# Patient Record
Sex: FEMALE | Race: WHITE | NOT HISPANIC OR LATINO
[De-identification: names, ages, dates, MRNs, and addresses within clinical notes are randomized per-mention and may not be internally consistent; named-entity substitution may affect disease eponyms.]

---

## 2020-04-30 ENCOUNTER — TRANSCRIPTION ENCOUNTER (OUTPATIENT)
Age: 55
End: 2020-04-30

## 2021-07-29 ENCOUNTER — RESULT REVIEW (OUTPATIENT)
Age: 56
End: 2021-07-29

## 2022-06-22 ENCOUNTER — OFFICE (OUTPATIENT)
Dept: URBAN - METROPOLITAN AREA CLINIC 12 | Facility: CLINIC | Age: 57
Setting detail: OPHTHALMOLOGY
End: 2022-06-22
Payer: COMMERCIAL

## 2022-06-22 DIAGNOSIS — H01.001: ICD-10-CM

## 2022-06-22 DIAGNOSIS — H01.004: ICD-10-CM

## 2022-06-22 DIAGNOSIS — D31.31: ICD-10-CM

## 2022-06-22 DIAGNOSIS — H25.13: ICD-10-CM

## 2022-06-22 PROCEDURE — 92004 COMPRE OPH EXAM NEW PT 1/>: CPT | Performed by: OPTOMETRIST

## 2022-06-22 PROCEDURE — 92250 FUNDUS PHOTOGRAPHY W/I&R: CPT | Performed by: OPTOMETRIST

## 2022-06-22 ASSESSMENT — REFRACTION_MANIFEST
OD_VA1: 20/20-
OD_ADD: +2.00
OS_SPHERE: PLANO
OD_CYLINDER: SPH
OS_CYLINDER: SPH
OD_SPHERE: +0.25
OS_ADD: +2.00
OS_VA1: 20/20-

## 2022-06-22 ASSESSMENT — VISUAL ACUITY
OS_BCVA: 20/20-2
OD_BCVA: 20/20-2

## 2022-06-22 ASSESSMENT — REFRACTION_CURRENTRX
OD_ADD: +2.00
OD_OVR_VA: 20/
OS_VPRISM_DIRECTION: SV
OS_OVR_VA: 20/
OS_ADD: +2.00
OD_VPRISM_DIRECTION: SV

## 2022-06-22 ASSESSMENT — AXIALLENGTH_DERIVED
OD_AL: 23.7629
OS_AL: 23.8678

## 2022-06-22 ASSESSMENT — KERATOMETRY
OS_AXISANGLE_DEGREES: 077
OD_K2POWER_DIOPTERS: 42.75
OD_AXISANGLE_DEGREES: 085
OS_K2POWER_DIOPTERS: 42.75
OD_K1POWER_DIOPTERS: 42.00
OS_K1POWER_DIOPTERS: 42.50

## 2022-06-22 ASSESSMENT — REFRACTION_AUTOREFRACTION
OD_SPHERE: +1.00
OS_AXIS: 106
OD_CYLINDER: -0.75
OD_AXIS: 089
OS_CYLINDER: -0.75
OS_SPHERE: +0.50

## 2022-06-22 ASSESSMENT — TONOMETRY
OD_IOP_MMHG: 15
OS_IOP_MMHG: 15

## 2022-06-22 ASSESSMENT — CONFRONTATIONAL VISUAL FIELD TEST (CVF)
OD_FINDINGS: FULL
OS_FINDINGS: FULL

## 2022-06-22 ASSESSMENT — SPHEQUIV_DERIVED
OS_SPHEQUIV: 0.125
OD_SPHEQUIV: 0.625

## 2022-06-22 ASSESSMENT — LID EXAM ASSESSMENTS
OD_BLEPHARITIS: T
OS_BLEPHARITIS: T

## 2022-08-06 ENCOUNTER — INPATIENT (INPATIENT)
Facility: HOSPITAL | Age: 57
LOS: 3 days | Discharge: HOME | DRG: 177 | End: 2022-08-10
Attending: HOSPITALIST | Admitting: HOSPITALIST
Payer: COMMERCIAL

## 2022-08-06 VITALS
RESPIRATION RATE: 19 BRPM | OXYGEN SATURATION: 96 % | HEART RATE: 96 BPM | TEMPERATURE: 99 F | SYSTOLIC BLOOD PRESSURE: 157 MMHG | WEIGHT: 250 LBS | HEIGHT: 63 IN | DIASTOLIC BLOOD PRESSURE: 94 MMHG

## 2022-08-06 DIAGNOSIS — J96.01 ACUTE RESPIRATORY FAILURE WITH HYPOXIA: ICD-10-CM

## 2022-08-06 LAB
ADD ON TEST-SPECIMEN IN LAB: SIGNIFICANT CHANGE UP
ALBUMIN SERPL ELPH-MCNC: 3.1 G/DL — LOW (ref 3.3–5)
ALP SERPL-CCNC: 104 U/L — SIGNIFICANT CHANGE UP (ref 40–120)
ALT FLD-CCNC: 10 U/L — LOW (ref 12–78)
ANION GAP SERPL CALC-SCNC: 6 MMOL/L — SIGNIFICANT CHANGE UP (ref 5–17)
AST SERPL-CCNC: <3 U/L — LOW (ref 15–37)
BASOPHILS # BLD AUTO: 0 K/UL — SIGNIFICANT CHANGE UP (ref 0–0.2)
BASOPHILS NFR BLD AUTO: 0 % — SIGNIFICANT CHANGE UP (ref 0–2)
BILIRUB SERPL-MCNC: 0.3 MG/DL — SIGNIFICANT CHANGE UP (ref 0.2–1.2)
BUN SERPL-MCNC: 14 MG/DL — SIGNIFICANT CHANGE UP (ref 7–23)
CALCIUM SERPL-MCNC: 8.8 MG/DL — SIGNIFICANT CHANGE UP (ref 8.5–10.1)
CHLORIDE SERPL-SCNC: 104 MMOL/L — SIGNIFICANT CHANGE UP (ref 96–108)
CO2 SERPL-SCNC: 25 MMOL/L — SIGNIFICANT CHANGE UP (ref 22–31)
CREAT SERPL-MCNC: 0.54 MG/DL — SIGNIFICANT CHANGE UP (ref 0.5–1.3)
CRP SERPL-MCNC: 9 MG/L — HIGH
D DIMER BLD IA.RAPID-MCNC: 180 NG/ML DDU — SIGNIFICANT CHANGE UP
EGFR: 108 ML/MIN/1.73M2 — SIGNIFICANT CHANGE UP
EOSINOPHIL # BLD AUTO: 0 K/UL — SIGNIFICANT CHANGE UP (ref 0–0.5)
EOSINOPHIL NFR BLD AUTO: 0 % — SIGNIFICANT CHANGE UP (ref 0–6)
FERRITIN SERPL-MCNC: 89 NG/ML — SIGNIFICANT CHANGE UP (ref 15–150)
GLUCOSE SERPL-MCNC: 149 MG/DL — HIGH (ref 70–99)
HCT VFR BLD CALC: 42.1 % — SIGNIFICANT CHANGE UP (ref 34.5–45)
HGB BLD-MCNC: 14.7 G/DL — SIGNIFICANT CHANGE UP (ref 11.5–15.5)
LIDOCAIN IGE QN: 76 U/L — SIGNIFICANT CHANGE UP (ref 73–393)
LYMPHOCYTES # BLD AUTO: 0.16 K/UL — LOW (ref 1–3.3)
LYMPHOCYTES # BLD AUTO: 3 % — LOW (ref 13–44)
MCHC RBC-ENTMCNC: 31.5 PG — SIGNIFICANT CHANGE UP (ref 27–34)
MCHC RBC-ENTMCNC: 34.9 GM/DL — SIGNIFICANT CHANGE UP (ref 32–36)
MCV RBC AUTO: 90.1 FL — SIGNIFICANT CHANGE UP (ref 80–100)
MONOCYTES # BLD AUTO: 0.16 K/UL — SIGNIFICANT CHANGE UP (ref 0–0.9)
MONOCYTES NFR BLD AUTO: 3 % — SIGNIFICANT CHANGE UP (ref 2–14)
NEUTROPHILS # BLD AUTO: 5.03 K/UL — SIGNIFICANT CHANGE UP (ref 1.8–7.4)
NEUTROPHILS NFR BLD AUTO: 92 % — HIGH (ref 43–77)
NRBC # BLD: SIGNIFICANT CHANGE UP /100 WBCS (ref 0–0)
PLATELET # BLD AUTO: 223 K/UL — SIGNIFICANT CHANGE UP (ref 150–400)
POTASSIUM SERPL-MCNC: 3.8 MMOL/L — SIGNIFICANT CHANGE UP (ref 3.5–5.3)
POTASSIUM SERPL-SCNC: 3.8 MMOL/L — SIGNIFICANT CHANGE UP (ref 3.5–5.3)
PROCALCITONIN SERPL-MCNC: 0.05 NG/ML — SIGNIFICANT CHANGE UP (ref 0.02–0.1)
PROT SERPL-MCNC: 6.8 GM/DL — SIGNIFICANT CHANGE UP (ref 6–8.3)
RBC # BLD: 4.67 M/UL — SIGNIFICANT CHANGE UP (ref 3.8–5.2)
RBC # FLD: 12.3 % — SIGNIFICANT CHANGE UP (ref 10.3–14.5)
SODIUM SERPL-SCNC: 135 MMOL/L — SIGNIFICANT CHANGE UP (ref 135–145)
WBC # BLD: 5.41 K/UL — SIGNIFICANT CHANGE UP (ref 3.8–10.5)
WBC # FLD AUTO: 5.41 K/UL — SIGNIFICANT CHANGE UP (ref 3.8–10.5)

## 2022-08-06 PROCEDURE — 83735 ASSAY OF MAGNESIUM: CPT

## 2022-08-06 PROCEDURE — 93005 ELECTROCARDIOGRAM TRACING: CPT

## 2022-08-06 PROCEDURE — 85379 FIBRIN DEGRADATION QUANT: CPT

## 2022-08-06 PROCEDURE — 36415 COLL VENOUS BLD VENIPUNCTURE: CPT

## 2022-08-06 PROCEDURE — 82306 VITAMIN D 25 HYDROXY: CPT

## 2022-08-06 PROCEDURE — 84145 PROCALCITONIN (PCT): CPT

## 2022-08-06 PROCEDURE — 86140 C-REACTIVE PROTEIN: CPT

## 2022-08-06 PROCEDURE — 80053 COMPREHEN METABOLIC PANEL: CPT

## 2022-08-06 PROCEDURE — 86769 SARS-COV-2 COVID-19 ANTIBODY: CPT

## 2022-08-06 PROCEDURE — 82728 ASSAY OF FERRITIN: CPT

## 2022-08-06 PROCEDURE — 85025 COMPLETE CBC W/AUTO DIFF WBC: CPT

## 2022-08-06 PROCEDURE — 93010 ELECTROCARDIOGRAM REPORT: CPT

## 2022-08-06 PROCEDURE — 71045 X-RAY EXAM CHEST 1 VIEW: CPT | Mod: 26

## 2022-08-06 PROCEDURE — 97116 GAIT TRAINING THERAPY: CPT | Mod: GP

## 2022-08-06 PROCEDURE — 84100 ASSAY OF PHOSPHORUS: CPT

## 2022-08-06 PROCEDURE — 97161 PT EVAL LOW COMPLEX 20 MIN: CPT | Mod: GP

## 2022-08-06 PROCEDURE — 84484 ASSAY OF TROPONIN QUANT: CPT

## 2022-08-06 PROCEDURE — 93970 EXTREMITY STUDY: CPT

## 2022-08-06 PROCEDURE — 99291 CRITICAL CARE FIRST HOUR: CPT

## 2022-08-06 RX ORDER — REMDESIVIR 5 MG/ML
100 INJECTION INTRAVENOUS EVERY 24 HOURS
Refills: 0 | Status: COMPLETED | OUTPATIENT
Start: 2022-08-07 | End: 2022-08-10

## 2022-08-06 RX ORDER — ENOXAPARIN SODIUM 100 MG/ML
40 INJECTION SUBCUTANEOUS EVERY 12 HOURS
Refills: 0 | Status: DISCONTINUED | OUTPATIENT
Start: 2022-08-06 | End: 2022-08-10

## 2022-08-06 RX ORDER — ONDANSETRON 8 MG/1
4 TABLET, FILM COATED ORAL ONCE
Refills: 0 | Status: COMPLETED | OUTPATIENT
Start: 2022-08-06 | End: 2022-08-06

## 2022-08-06 RX ORDER — DEXAMETHASONE 0.5 MG/5ML
6 ELIXIR ORAL ONCE
Refills: 0 | Status: COMPLETED | OUTPATIENT
Start: 2022-08-06 | End: 2022-08-06

## 2022-08-06 RX ORDER — ONDANSETRON 8 MG/1
8 TABLET, FILM COATED ORAL EVERY 8 HOURS
Refills: 0 | Status: DISCONTINUED | OUTPATIENT
Start: 2022-08-06 | End: 2022-08-10

## 2022-08-06 RX ORDER — SODIUM CHLORIDE 9 MG/ML
2000 INJECTION INTRAMUSCULAR; INTRAVENOUS; SUBCUTANEOUS ONCE
Refills: 0 | Status: COMPLETED | OUTPATIENT
Start: 2022-08-06 | End: 2022-08-06

## 2022-08-06 RX ORDER — DEXAMETHASONE 0.5 MG/5ML
6 ELIXIR ORAL DAILY
Refills: 0 | Status: DISCONTINUED | OUTPATIENT
Start: 2022-08-07 | End: 2022-08-10

## 2022-08-06 RX ORDER — FAMOTIDINE 10 MG/ML
20 INJECTION INTRAVENOUS ONCE
Refills: 0 | Status: COMPLETED | OUTPATIENT
Start: 2022-08-06 | End: 2022-08-06

## 2022-08-06 RX ORDER — PROCHLORPERAZINE MALEATE 5 MG
10 TABLET ORAL ONCE
Refills: 0 | Status: COMPLETED | OUTPATIENT
Start: 2022-08-06 | End: 2022-08-06

## 2022-08-06 RX ORDER — ALBUTEROL 90 UG/1
2 AEROSOL, METERED ORAL EVERY 4 HOURS
Refills: 0 | Status: DISCONTINUED | OUTPATIENT
Start: 2022-08-06 | End: 2022-08-10

## 2022-08-06 RX ORDER — REMDESIVIR 5 MG/ML
200 INJECTION INTRAVENOUS EVERY 24 HOURS
Refills: 0 | Status: COMPLETED | OUTPATIENT
Start: 2022-08-06 | End: 2022-08-06

## 2022-08-06 RX ORDER — ACETAMINOPHEN 500 MG
650 TABLET ORAL EVERY 4 HOURS
Refills: 0 | Status: DISCONTINUED | OUTPATIENT
Start: 2022-08-06 | End: 2022-08-10

## 2022-08-06 RX ORDER — REMDESIVIR 5 MG/ML
INJECTION INTRAVENOUS
Refills: 0 | Status: COMPLETED | OUTPATIENT
Start: 2022-08-06 | End: 2022-08-10

## 2022-08-06 RX ADMIN — Medication 10 MILLIGRAM(S): at 05:09

## 2022-08-06 RX ADMIN — REMDESIVIR 500 MILLIGRAM(S): 5 INJECTION INTRAVENOUS at 12:38

## 2022-08-06 RX ADMIN — Medication 6 MILLIGRAM(S): at 08:26

## 2022-08-06 RX ADMIN — SODIUM CHLORIDE 2000 MILLILITER(S): 9 INJECTION INTRAMUSCULAR; INTRAVENOUS; SUBCUTANEOUS at 02:59

## 2022-08-06 RX ADMIN — ENOXAPARIN SODIUM 40 MILLIGRAM(S): 100 INJECTION SUBCUTANEOUS at 21:03

## 2022-08-06 RX ADMIN — ONDANSETRON 4 MILLIGRAM(S): 8 TABLET, FILM COATED ORAL at 08:42

## 2022-08-06 RX ADMIN — FAMOTIDINE 20 MILLIGRAM(S): 10 INJECTION INTRAVENOUS at 03:03

## 2022-08-06 RX ADMIN — ONDANSETRON 4 MILLIGRAM(S): 8 TABLET, FILM COATED ORAL at 03:03

## 2022-08-06 RX ADMIN — ENOXAPARIN SODIUM 40 MILLIGRAM(S): 100 INJECTION SUBCUTANEOUS at 12:38

## 2022-08-06 NOTE — H&P ADULT - NSHPPHYSICALEXAM_GEN_ALL_CORE
ICU Vital Signs Last 24 Hrs  T(C): 36.8 (06 Aug 2022 07:15), Max: 37.1 (06 Aug 2022 02:04)  T(F): 98.2 (06 Aug 2022 07:15), Max: 98.7 (06 Aug 2022 02:04)  HR: 84 (06 Aug 2022 07:15) (84 - 96)  BP: 157/71 (06 Aug 2022 07:15) (157/71 - 157/94)  BP(mean): 89 (06 Aug 2022 05:44) (89 - 89)  ABP: --  ABP(mean): --  RR: 18 (06 Aug 2022 07:15) (18 - 19)  SpO2: 93% (06 Aug 2022 07:47) (89% - 96%)    O2 Parameters below as of 06 Aug 2022 07:47  Patient On (Oxygen Delivery Method): nasal cannula  O2 Flow (L/min): 2          PHYSICAL EXAM:    Constitutional: NAD, awake and alert  HEENT: PERR, EOMI, Normal Hearing, MMM  Neck: Soft and supple, No LAD, No JVD  Respiratory: Breath sounds are clear bilaterally, No wheezing, rales or rhonchi  Cardiovascular: S1 and S2, regular rate and rhythm, no Murmurs, gallops or rubs  Gastrointestinal: Bowel Sounds present, soft, nontender, nondistended, no guarding, no rebound  Extremities: No peripheral edema  Vascular: 2+ peripheral pulses  Neurological: A/O x 3, no focal deficits  Musculoskeletal: 5/5 strength b/l upper and lower extremities  Skin: No rashes

## 2022-08-06 NOTE — ED ADULT NURSE REASSESSMENT NOTE - NS ED NURSE REASSESS COMMENT FT1
Assumed care of Pt from MAXINE Kirby. Pt received resting comfortably in stretcher. Vital signs reassessed and stable at this time. Pt requesting a phone  for android. No additional requests or complaints. Patient safety maintained. Call to [facility], report given to MAXINE Naranjo. Pending transport.

## 2022-08-06 NOTE — ED ADULT NURSE REASSESSMENT NOTE - NS ED NURSE REASSESS COMMENT FT1
Received pt from prior RN, pt is a&0x4, airway patent, does not show any s/s of respiratory distress, breathing is unlabored, color is culturally appropriate, vs are WNL pt ordering lunch at this time, pt not in any distress

## 2022-08-06 NOTE — ED PROVIDER NOTE - OBJECTIVE STATEMENT
58 yo f pw n/v covid positive 58 yo female presents to the ED c/o n/v. Pt COVID + since Tuesday. Unable to eat since this morning, and reports x8 episodes of vomiting today per EMS. Pt also notes some throat pain.

## 2022-08-06 NOTE — ED ADULT NURSE NOTE - OBJECTIVE STATEMENT
Pt presents to the ED c/o vomiting since today. pt tested positive for covid on Tuesday. pt endorses nausea, SOB when vomiting and throat pain. Respirations even and unlabored, air way patent, O2 saturation 85-92% on room air, placed on 2L Nasal cannula now satting 96%.

## 2022-08-06 NOTE — ED PROVIDER NOTE - PHYSICAL EXAMINATION
Gen:  Well appearing in NAD  Head:  NC/AT  HEENT: pupils perrl,no pharyngeal erythema, uvula midline  Cardiac: S1S2, RRR  Abd: Soft, non tender  Resp: No distress, CTA   musculoskeletal:: no deformities, no swelling, strength +5/+5  Skin: warm and dry as visualized, no rashes  Neuro: GIBBONS, aao x 4  Psych:alert, cooperative, appropriate mood and affect for situation Gen:  Well appearing in NAD  Head:  NC/AT  HEENT: pupils perrl,+  pharyngeal erythema, uvula midline  Cardiac: S1S2, RRR  Abd: Soft, non tender  Resp: No distress, CTA   musculoskeletal:: no deformities, no swelling, strength +5/+5  Skin: warm and dry as visualized, no rashes  Neuro: GIBBONS, aao x 4  Psych:alert, cooperative, appropriate mood and affect for situation

## 2022-08-06 NOTE — ED PROVIDER NOTE - PROGRESS NOTE DETAILS
so am doctor re evaluation may go home if can tolerate po B Tom BOUCHER the patient was signed out to me pending reassessement for vomiting. the patient was found to be hypoxic to 85% on RA. improved to 93% on 2L. pt states dx covid on Tuesday. double vaccinated. feels nausea vomiting and SOB since then. no cp. labs xray added. xray without consolidation. pt endorsed to DR. Byrd accepts. Nathen Ornelas M.D., Attending Physician

## 2022-08-06 NOTE — ED ADULT NURSE REASSESSMENT NOTE - NS ED NURSE REASSESS COMMENT FT1
Received patient @ 0700 am- Pt. C/O SOB- Sats 89-93 RA- Pt. reports being diagnosed with Covid last Tuesday- Vaccinated x 2 with Moderna- Pt. placed on 2LNC - Dr. Ornelas to the bedside and patient to be admitted- Patient undressed and comfort care initiated- Cardiac monitor in place- Will cont to monitor patient closely- Safety maintained

## 2022-08-06 NOTE — H&P ADULT - HIV OFFER
Opt out PRINCIPAL DISCHARGE DIAGNOSIS  Diagnosis: Problem related to social environment  Assessment and Plan of Treatment: You were discharged to rehab on March 10 2022 and unfortunately encoutered a poor experience. You returned to the hospital and  connected you with other rehab facilities. We found an alternative facility for you and transitioned your care there.

## 2022-08-06 NOTE — H&P ADULT - NSHPREVIEWOFSYSTEMS_GEN_ALL_CORE
REVIEW OF SYSTEMS:    CONSTITUTIONAL: No weakness, fevers or chills  EYES/ENT: No visual changes;  No vertigo or throat pain   NECK: No pain or stiffness  RESPIRATORY:+shortness of breath  CARDIOVASCULAR: No chest pain or palpitations  GASTROINTESTINAL: No abdominal or epigastric pain. No nausea, vomiting, or hematemesis; No diarrhea or constipation. No melena or hematochezia.  GENITOURINARY: No dysuria, frequency or hematuria  NEUROLOGICAL: No numbness or weakness  SKIN: No itching, burning, rashes, or lesions   All other review of systems is negative unless indicated above

## 2022-08-06 NOTE — ED ADULT TRIAGE NOTE - CHIEF COMPLAINT QUOTE
COVID + since Tuesday. + vomiting. unable to eat since this morning, x8 episodes of vomiting today per EMS. c/o throat pain. directly bedded into room placed on isolation precautions.

## 2022-08-06 NOTE — H&P ADULT - HISTORY OF PRESENT ILLNESS
58 yo F with no pertinent medical history p/w sob and flu like symptoms. Diagnosed with covid on tuesday. Since then sob and generalized malaise with nausea. Hypoxic to 85% in ED on ra corrected to 93% on 2L.   CXR without infiltrates. Awaiting dimer and inflammatory markers      Denies cp. no HA. No fevers,  Pt double vaxxed with moderna.   no travel       social: neg x 3  Shx" none  Fhx: none

## 2022-08-06 NOTE — H&P ADULT - ASSESSMENT
A:  Acute hypoxemic respiratory failure 2/2 to interstitial pneumonia secondary to COVID-19      P:  - admit to any floor  - pulse ox q8h  - O2 supplemental and maintain SpO2 between 88-94%  - if requiring >8L NC-> switch to 100% NRB or HFNC  - IF still not responding to above or having increased work of breathing-> trial of bipap in negative pressure isolation room  - IV decadron 6mg IV qd for 5-10 days  - remdesivir. discussed risks and benefits with patient  - Obtain baseline and inflammatory markers. Monitor Q72hrs  - Limit use of NSAIDs  - Albuterol MDI to limit aerosolization  - DVT px: COVID patients to be start on LMWH as per recent data supporting hypercoagubale state especially with high dimers with no absolute CI to its use ie GI bleed or other stigmata of bleeding within last 3 months or PLTs < 50    Awiating dimer, will make adj to lmwh as deemed fit based on results    FULL CODE

## 2022-08-06 NOTE — H&P ADULT - NSHPLABSRESULTS_GEN_ALL_CORE
CBC:            14.7   5.41  )-----------( 223      ( 08-06-22 @ 02:50 )             42.1         Chem:         ( 08-06-22 @ 02:50 )    135  |  104  |  14  ----------------------------<  149<H>  3.8   |  25  |  0.54        Liver Functions: ( 08-06-22 @ 02:50 )  Alb: 3.1 g/dL / Pro: 6.8 gm/dL / ALK PHOS: 104 U/L / ALT: 10 U/L / AST: <3 U/L / GGT: x              Type & Screen:     cxr noted : groslly clear on wet read

## 2022-08-06 NOTE — ED PROVIDER NOTE - CRITICAL CARE ATTENDING CONTRIBUTION TO CARE
direct patient care (not related to procedure), additional history taking, interpretation of diagnostic studies, documentation, consultation with other physicians, consult w/ pt's family directly relating to pts condition  B Tom BOUCHER

## 2022-08-06 NOTE — PATIENT PROFILE ADULT - FALL HARM RISK - HARM RISK INTERVENTIONS

## 2022-08-07 LAB
24R-OH-CALCIDIOL SERPL-MCNC: 10.1 NG/ML — LOW (ref 30–80)
ALBUMIN SERPL ELPH-MCNC: 3 G/DL — LOW (ref 3.3–5)
ALP SERPL-CCNC: 94 U/L — SIGNIFICANT CHANGE UP (ref 40–120)
ALT FLD-CCNC: 12 U/L — SIGNIFICANT CHANGE UP (ref 12–78)
ANION GAP SERPL CALC-SCNC: 6 MMOL/L — SIGNIFICANT CHANGE UP (ref 5–17)
AST SERPL-CCNC: 12 U/L — LOW (ref 15–37)
BASOPHILS # BLD AUTO: 0.03 K/UL — SIGNIFICANT CHANGE UP (ref 0–0.2)
BASOPHILS NFR BLD AUTO: 0.4 % — SIGNIFICANT CHANGE UP (ref 0–2)
BILIRUB SERPL-MCNC: 0.3 MG/DL — SIGNIFICANT CHANGE UP (ref 0.2–1.2)
BUN SERPL-MCNC: 18 MG/DL — SIGNIFICANT CHANGE UP (ref 7–23)
CALCIUM SERPL-MCNC: 9.1 MG/DL — SIGNIFICANT CHANGE UP (ref 8.5–10.1)
CHLORIDE SERPL-SCNC: 109 MMOL/L — HIGH (ref 96–108)
CO2 SERPL-SCNC: 25 MMOL/L — SIGNIFICANT CHANGE UP (ref 22–31)
COVID-19 NUCLEOCAPSID GAM AB INTERP: NEGATIVE — SIGNIFICANT CHANGE UP
COVID-19 NUCLEOCAPSID TOTAL GAM ANTIBODY RESULT: 0.07 INDEX — SIGNIFICANT CHANGE UP
COVID-19 SPIKE DOMAIN AB INTERP: POSITIVE
COVID-19 SPIKE DOMAIN ANTIBODY RESULT: 123 U/ML — HIGH
CREAT SERPL-MCNC: 0.76 MG/DL — SIGNIFICANT CHANGE UP (ref 0.5–1.3)
CRP SERPL-MCNC: 8 MG/L — HIGH
D DIMER BLD IA.RAPID-MCNC: <150 NG/ML DDU — SIGNIFICANT CHANGE UP
EGFR: 92 ML/MIN/1.73M2 — SIGNIFICANT CHANGE UP
EOSINOPHIL # BLD AUTO: 0.01 K/UL — SIGNIFICANT CHANGE UP (ref 0–0.5)
EOSINOPHIL NFR BLD AUTO: 0.1 % — SIGNIFICANT CHANGE UP (ref 0–6)
FERRITIN SERPL-MCNC: 127 NG/ML — SIGNIFICANT CHANGE UP (ref 15–150)
GLUCOSE SERPL-MCNC: 141 MG/DL — HIGH (ref 70–99)
HCT VFR BLD CALC: 41.8 % — SIGNIFICANT CHANGE UP (ref 34.5–45)
HGB BLD-MCNC: 14.4 G/DL — SIGNIFICANT CHANGE UP (ref 11.5–15.5)
IMM GRANULOCYTES NFR BLD AUTO: 0.3 % — SIGNIFICANT CHANGE UP (ref 0–1.5)
LYMPHOCYTES # BLD AUTO: 0.84 K/UL — LOW (ref 1–3.3)
LYMPHOCYTES # BLD AUTO: 12.6 % — LOW (ref 13–44)
MCHC RBC-ENTMCNC: 31.9 PG — SIGNIFICANT CHANGE UP (ref 27–34)
MCHC RBC-ENTMCNC: 34.4 GM/DL — SIGNIFICANT CHANGE UP (ref 32–36)
MCV RBC AUTO: 92.7 FL — SIGNIFICANT CHANGE UP (ref 80–100)
MONOCYTES # BLD AUTO: 0.22 K/UL — SIGNIFICANT CHANGE UP (ref 0–0.9)
MONOCYTES NFR BLD AUTO: 3.3 % — SIGNIFICANT CHANGE UP (ref 2–14)
NEUTROPHILS # BLD AUTO: 5.57 K/UL — SIGNIFICANT CHANGE UP (ref 1.8–7.4)
NEUTROPHILS NFR BLD AUTO: 83.3 % — HIGH (ref 43–77)
PLATELET # BLD AUTO: 223 K/UL — SIGNIFICANT CHANGE UP (ref 150–400)
POTASSIUM SERPL-MCNC: 4.1 MMOL/L — SIGNIFICANT CHANGE UP (ref 3.5–5.3)
POTASSIUM SERPL-SCNC: 4.1 MMOL/L — SIGNIFICANT CHANGE UP (ref 3.5–5.3)
PROCALCITONIN SERPL-MCNC: 0.05 NG/ML — SIGNIFICANT CHANGE UP (ref 0.02–0.1)
PROT SERPL-MCNC: 6.6 GM/DL — SIGNIFICANT CHANGE UP (ref 6–8.3)
RBC # BLD: 4.51 M/UL — SIGNIFICANT CHANGE UP (ref 3.8–5.2)
RBC # FLD: 12.5 % — SIGNIFICANT CHANGE UP (ref 10.3–14.5)
SARS-COV-2 IGG+IGM SERPL QL IA: 0.07 INDEX — SIGNIFICANT CHANGE UP
SARS-COV-2 IGG+IGM SERPL QL IA: 123 U/ML — HIGH
SARS-COV-2 IGG+IGM SERPL QL IA: NEGATIVE — SIGNIFICANT CHANGE UP
SARS-COV-2 IGG+IGM SERPL QL IA: POSITIVE
SODIUM SERPL-SCNC: 140 MMOL/L — SIGNIFICANT CHANGE UP (ref 135–145)
WBC # BLD: 6.69 K/UL — SIGNIFICANT CHANGE UP (ref 3.8–10.5)
WBC # FLD AUTO: 6.69 K/UL — SIGNIFICANT CHANGE UP (ref 3.8–10.5)

## 2022-08-07 PROCEDURE — 93970 EXTREMITY STUDY: CPT | Mod: 26

## 2022-08-07 PROCEDURE — 99232 SBSQ HOSP IP/OBS MODERATE 35: CPT

## 2022-08-07 RX ORDER — ERGOCALCIFEROL 1.25 MG/1
50000 CAPSULE ORAL
Refills: 0 | Status: DISCONTINUED | OUTPATIENT
Start: 2022-08-07 | End: 2022-08-10

## 2022-08-07 RX ORDER — SODIUM CHLORIDE 0.65 %
1 AEROSOL, SPRAY (ML) NASAL
Refills: 0 | Status: DISCONTINUED | OUTPATIENT
Start: 2022-08-07 | End: 2022-08-10

## 2022-08-07 RX ORDER — FAMOTIDINE 10 MG/ML
20 INJECTION INTRAVENOUS DAILY
Refills: 0 | Status: DISCONTINUED | OUTPATIENT
Start: 2022-08-07 | End: 2022-08-10

## 2022-08-07 RX ADMIN — REMDESIVIR 500 MILLIGRAM(S): 5 INJECTION INTRAVENOUS at 12:36

## 2022-08-07 RX ADMIN — Medication 6 MILLIGRAM(S): at 06:32

## 2022-08-07 RX ADMIN — ENOXAPARIN SODIUM 40 MILLIGRAM(S): 100 INJECTION SUBCUTANEOUS at 11:07

## 2022-08-07 RX ADMIN — ENOXAPARIN SODIUM 40 MILLIGRAM(S): 100 INJECTION SUBCUTANEOUS at 20:50

## 2022-08-07 RX ADMIN — FAMOTIDINE 20 MILLIGRAM(S): 10 INJECTION INTRAVENOUS at 11:07

## 2022-08-07 NOTE — CONSULT NOTE ADULT - ASSESSMENT
58 yo F with no pertinent medical history admitted on 8/6 for evaluation of shortness of breath, and flu like symptoms, was diagnosed with COVID-19 infection on 8/2; upon admission has been on supplemental oxygen due to hypoxia. Has been vaccinated against COVID-19.     1. Patient admitted with COVID-19 infection, requiring supplemental oxygen  - follow up cultures   - serial cbc and monitor temperature   - reviewed prior medical records to evaluate for resistant or atypical pathogens   - oxygen and nebs as needed   - iv hydration and supportive care   - will continue dexamethasone  - monitor renal and hepatic function while on remdesivir  -  no role for antibiotics  - continue isolation   2. other issues; per medicine

## 2022-08-07 NOTE — CONSULT NOTE ADULT - SUBJECTIVE AND OBJECTIVE BOX
Patient is a 57y old  Female who presents with a chief complaint of sob (06 Aug 2022 09:45)    HPI:  56 yo F with no pertinent medical history admitted on 8/6 for evaluation of shortness of breath, and flu like symptoms, was diagnosed with COVID-19 infection on 8/2; upon admission has been on supplemental oxygen due to hypoxia. Has been vaccinated against COVID-19.     PMH: as above  PSH: as above  Meds: per reconciliation sheet, noted below  MEDICATIONS  (STANDING):  dexAMETHasone  Injectable 6 milliGRAM(s) IV Push daily  enoxaparin Injectable 40 milliGRAM(s) SubCutaneous every 12 hours  famotidine    Tablet 20 milliGRAM(s) Oral daily  remdesivir  IVPB   IV Intermittent   remdesivir  IVPB 100 milliGRAM(s) IV Intermittent every 24 hours    MEDICATIONS  (PRN):  acetaminophen     Tablet .. 650 milliGRAM(s) Oral every 4 hours PRN Temp greater or equal to 38.5C (101.3F)  ALBUTerol    90 MICROgram(s) HFA Inhaler 2 Puff(s) Inhalation every 4 hours PRN Shortness of Breath and/or Wheezing  benzonatate 100 milliGRAM(s) Oral three times a day PRN Cough  ondansetron Injectable 8 milliGRAM(s) IV Push every 8 hours PRN Nausea and/or Vomiting  sodium chloride 0.65% Nasal 1 Spray(s) Both Nostrils every 2 hours PRN Nasal Congestion    Allergies    Keflex (Unknown)    Intolerances      Social: no smoking, no alcohol, no illegal drugs; no recent travel, no exposure to TB  FAMILY HISTORY:     no history of premature cardiovascular disease in first degree relatives  ROS: the patient denies fever, no chills, no HA, no dizziness, no sore throat, no blurry vision, no CP, no palpitations,  no abdominal pain, no diarrhea, no N/V, no dysuria, no leg pain, no claudication, no rash, no joint aches, no rectal pain or bleeding, no night sweats  All other systems reviewed and are negative    Vital Signs Last 24 Hrs  T(C): 37.1 (07 Aug 2022 15:50), Max: 37.1 (07 Aug 2022 15:50)  T(F): 98.7 (07 Aug 2022 15:50), Max: 98.7 (07 Aug 2022 15:50)  HR: 64 (07 Aug 2022 15:50) (56 - 64)  BP: 130/69 (07 Aug 2022 15:50) (126/58 - 159/63)  BP(mean): 75 (06 Aug 2022 20:06) (75 - 75)  RR: 18 (07 Aug 2022 15:50) (17 - 19)  SpO2: 98% (07 Aug 2022 15:50) (97% - 100%)    Parameters below as of 07 Aug 2022 15:50  Patient On (Oxygen Delivery Method): nasal cannula  O2 Flow (L/min): 2    Daily     Daily     PE:    Constitutional: frail looking  HEENT: NC/AT, EOMI, PERRLA, conjunctivae clear; ears and nose atraumatic; pharynx clear  Neck: supple; thyroid not palpable  Back: no tenderness  Respiratory: respiratory effort normal; clear to auscultation  Cardiovascular: S1S2 regular, no murmurs  Abdomen: soft, not tender, not distended, positive BS; no liver or spleen organomegaly  Genitourinary: no suprapubic tenderness  Musculoskeletal: no muscle tenderness, no joint swelling or tenderness  Neurological/ Psychiatric: AxOx3, judgement and insight normal;  moving all extremities  Skin: no rashes; no palpable lesions    Labs: all available labs reviewed                        14.4   6.69  )-----------( 223      ( 07 Aug 2022 09:18 )             41.8     08-07    140  |  109<H>  |  18  ----------------------------<  141<H>  4.1   |  25  |  0.76    Ca    9.1      07 Aug 2022 09:18    TPro  6.6  /  Alb  3.0<L>  /  TBili  0.3  /  DBili  x   /  AST  12<L>  /  ALT  12  /  AlkPhos  94  08-07     LIVER FUNCTIONS - ( 07 Aug 2022 09:18 )  Alb: 3.0 g/dL / Pro: 6.6 gm/dL / ALK PHOS: 94 U/L / ALT: 12 U/L / AST: 12 U/L / GGT: x                 Radiology: all available radiological tests reviewed    Advanced directives addressed: full resuscitation

## 2022-08-08 LAB
ALBUMIN SERPL ELPH-MCNC: 3 G/DL — LOW (ref 3.3–5)
ALP SERPL-CCNC: 81 U/L — SIGNIFICANT CHANGE UP (ref 40–120)
ALT FLD-CCNC: 14 U/L — SIGNIFICANT CHANGE UP (ref 12–78)
ANION GAP SERPL CALC-SCNC: 5 MMOL/L — SIGNIFICANT CHANGE UP (ref 5–17)
AST SERPL-CCNC: 10 U/L — LOW (ref 15–37)
BASOPHILS # BLD AUTO: 0.03 K/UL — SIGNIFICANT CHANGE UP (ref 0–0.2)
BASOPHILS NFR BLD AUTO: 0.6 % — SIGNIFICANT CHANGE UP (ref 0–2)
BILIRUB SERPL-MCNC: 0.3 MG/DL — SIGNIFICANT CHANGE UP (ref 0.2–1.2)
BUN SERPL-MCNC: 20 MG/DL — SIGNIFICANT CHANGE UP (ref 7–23)
CALCIUM SERPL-MCNC: 8.8 MG/DL — SIGNIFICANT CHANGE UP (ref 8.5–10.1)
CHLORIDE SERPL-SCNC: 107 MMOL/L — SIGNIFICANT CHANGE UP (ref 96–108)
CO2 SERPL-SCNC: 28 MMOL/L — SIGNIFICANT CHANGE UP (ref 22–31)
CREAT SERPL-MCNC: 0.6 MG/DL — SIGNIFICANT CHANGE UP (ref 0.5–1.3)
CRP SERPL-MCNC: 4 MG/L — SIGNIFICANT CHANGE UP
EGFR: 105 ML/MIN/1.73M2 — SIGNIFICANT CHANGE UP
EOSINOPHIL # BLD AUTO: 0.03 K/UL — SIGNIFICANT CHANGE UP (ref 0–0.5)
EOSINOPHIL NFR BLD AUTO: 0.6 % — SIGNIFICANT CHANGE UP (ref 0–6)
FERRITIN SERPL-MCNC: 110 NG/ML — SIGNIFICANT CHANGE UP (ref 15–150)
GLUCOSE SERPL-MCNC: 94 MG/DL — SIGNIFICANT CHANGE UP (ref 70–99)
HCT VFR BLD CALC: 43.6 % — SIGNIFICANT CHANGE UP (ref 34.5–45)
HGB BLD-MCNC: 14.8 G/DL — SIGNIFICANT CHANGE UP (ref 11.5–15.5)
IMM GRANULOCYTES NFR BLD AUTO: 0.2 % — SIGNIFICANT CHANGE UP (ref 0–1.5)
LYMPHOCYTES # BLD AUTO: 2.18 K/UL — SIGNIFICANT CHANGE UP (ref 1–3.3)
LYMPHOCYTES # BLD AUTO: 41.3 % — SIGNIFICANT CHANGE UP (ref 13–44)
MAGNESIUM SERPL-MCNC: 1.9 MG/DL — SIGNIFICANT CHANGE UP (ref 1.6–2.6)
MCHC RBC-ENTMCNC: 31.8 PG — SIGNIFICANT CHANGE UP (ref 27–34)
MCHC RBC-ENTMCNC: 33.9 GM/DL — SIGNIFICANT CHANGE UP (ref 32–36)
MCV RBC AUTO: 93.6 FL — SIGNIFICANT CHANGE UP (ref 80–100)
MONOCYTES # BLD AUTO: 0.36 K/UL — SIGNIFICANT CHANGE UP (ref 0–0.9)
MONOCYTES NFR BLD AUTO: 6.8 % — SIGNIFICANT CHANGE UP (ref 2–14)
NEUTROPHILS # BLD AUTO: 2.67 K/UL — SIGNIFICANT CHANGE UP (ref 1.8–7.4)
NEUTROPHILS NFR BLD AUTO: 50.5 % — SIGNIFICANT CHANGE UP (ref 43–77)
PHOSPHATE SERPL-MCNC: 3.1 MG/DL — SIGNIFICANT CHANGE UP (ref 2.5–4.5)
PLATELET # BLD AUTO: 226 K/UL — SIGNIFICANT CHANGE UP (ref 150–400)
POTASSIUM SERPL-MCNC: 3.6 MMOL/L — SIGNIFICANT CHANGE UP (ref 3.5–5.3)
POTASSIUM SERPL-SCNC: 3.6 MMOL/L — SIGNIFICANT CHANGE UP (ref 3.5–5.3)
PROCALCITONIN SERPL-MCNC: 0.03 NG/ML — SIGNIFICANT CHANGE UP (ref 0.02–0.1)
PROT SERPL-MCNC: 6.4 GM/DL — SIGNIFICANT CHANGE UP (ref 6–8.3)
RBC # BLD: 4.66 M/UL — SIGNIFICANT CHANGE UP (ref 3.8–5.2)
RBC # FLD: 12.7 % — SIGNIFICANT CHANGE UP (ref 10.3–14.5)
SODIUM SERPL-SCNC: 140 MMOL/L — SIGNIFICANT CHANGE UP (ref 135–145)
TROPONIN I, HIGH SENSITIVITY RESULT: 10.94 NG/L — SIGNIFICANT CHANGE UP
WBC # BLD: 5.28 K/UL — SIGNIFICANT CHANGE UP (ref 3.8–10.5)
WBC # FLD AUTO: 5.28 K/UL — SIGNIFICANT CHANGE UP (ref 3.8–10.5)

## 2022-08-08 PROCEDURE — 99232 SBSQ HOSP IP/OBS MODERATE 35: CPT

## 2022-08-08 RX ADMIN — Medication 6 MILLIGRAM(S): at 10:43

## 2022-08-08 RX ADMIN — Medication 1 SPRAY(S): at 08:47

## 2022-08-08 RX ADMIN — FAMOTIDINE 20 MILLIGRAM(S): 10 INJECTION INTRAVENOUS at 10:43

## 2022-08-08 RX ADMIN — ENOXAPARIN SODIUM 40 MILLIGRAM(S): 100 INJECTION SUBCUTANEOUS at 10:42

## 2022-08-08 RX ADMIN — REMDESIVIR 500 MILLIGRAM(S): 5 INJECTION INTRAVENOUS at 10:42

## 2022-08-08 RX ADMIN — ENOXAPARIN SODIUM 40 MILLIGRAM(S): 100 INJECTION SUBCUTANEOUS at 21:43

## 2022-08-08 RX ADMIN — ERGOCALCIFEROL 50000 UNIT(S): 1.25 CAPSULE ORAL at 10:43

## 2022-08-09 LAB
ALBUMIN SERPL ELPH-MCNC: 3 G/DL — LOW (ref 3.3–5)
ALP SERPL-CCNC: 88 U/L — SIGNIFICANT CHANGE UP (ref 40–120)
ALT FLD-CCNC: 46 U/L — SIGNIFICANT CHANGE UP (ref 12–78)
ANION GAP SERPL CALC-SCNC: 7 MMOL/L — SIGNIFICANT CHANGE UP (ref 5–17)
AST SERPL-CCNC: 40 U/L — HIGH (ref 15–37)
BILIRUB SERPL-MCNC: 0.2 MG/DL — SIGNIFICANT CHANGE UP (ref 0.2–1.2)
BUN SERPL-MCNC: 17 MG/DL — SIGNIFICANT CHANGE UP (ref 7–23)
CALCIUM SERPL-MCNC: 9 MG/DL — SIGNIFICANT CHANGE UP (ref 8.5–10.1)
CHLORIDE SERPL-SCNC: 106 MMOL/L — SIGNIFICANT CHANGE UP (ref 96–108)
CO2 SERPL-SCNC: 28 MMOL/L — SIGNIFICANT CHANGE UP (ref 22–31)
CREAT SERPL-MCNC: 0.68 MG/DL — SIGNIFICANT CHANGE UP (ref 0.5–1.3)
EGFR: 102 ML/MIN/1.73M2 — SIGNIFICANT CHANGE UP
GLUCOSE SERPL-MCNC: 148 MG/DL — HIGH (ref 70–99)
POTASSIUM SERPL-MCNC: 3.7 MMOL/L — SIGNIFICANT CHANGE UP (ref 3.5–5.3)
POTASSIUM SERPL-SCNC: 3.7 MMOL/L — SIGNIFICANT CHANGE UP (ref 3.5–5.3)
PROT SERPL-MCNC: 6.7 GM/DL — SIGNIFICANT CHANGE UP (ref 6–8.3)
SODIUM SERPL-SCNC: 141 MMOL/L — SIGNIFICANT CHANGE UP (ref 135–145)

## 2022-08-09 PROCEDURE — 99232 SBSQ HOSP IP/OBS MODERATE 35: CPT

## 2022-08-09 RX ADMIN — ENOXAPARIN SODIUM 40 MILLIGRAM(S): 100 INJECTION SUBCUTANEOUS at 11:08

## 2022-08-09 RX ADMIN — ENOXAPARIN SODIUM 40 MILLIGRAM(S): 100 INJECTION SUBCUTANEOUS at 21:24

## 2022-08-09 RX ADMIN — Medication 6 MILLIGRAM(S): at 11:09

## 2022-08-09 RX ADMIN — REMDESIVIR 500 MILLIGRAM(S): 5 INJECTION INTRAVENOUS at 11:08

## 2022-08-09 RX ADMIN — FAMOTIDINE 20 MILLIGRAM(S): 10 INJECTION INTRAVENOUS at 11:08

## 2022-08-09 NOTE — PHYSICAL THERAPY INITIAL EVALUATION ADULT - GENERAL OBSERVATIONS, REHAB EVAL
Patient received in bed on 2SW, +Airborne Precautions for COVID-19. Patient denied pain, SOB.  On room air sats at 98% at rest.

## 2022-08-09 NOTE — PROGRESS NOTE ADULT - ASSESSMENT
Acute hypoxemic respiratory failure  due to COVID-19 viral syndrome, and possible interstitial viral PNA   - pulse ox continious   - O2 supplemental and maintain SpO2 between 88-94% on 2.5 L   - if requiring >8L NC-> switch to 100% NRB or HFNC  - IF still not responding to above or having increased work of breathing-> trial of bipap in negative pressure isolation room  - IV decadron 6mg IV qd for 5-10 days  with pepcid , CRP 9--> 8   - remdesivir. discussed risks and benefits with patient  - Obtain baseline and inflammatory markers. Monitor Q72hrs  - Limit use of NSAIDs  - Albuterol MDI to limit aerosolization  - DVT px: COVID patients to be start on LMWH as per recent data supporting hypercoagulable state especially with high dimers with no absolute CI to its use ie GI bleed or other stigmata of bleeding within last 3 months or PLTs < 50  - nasal saline spray     Leg edema   - doppler - neg for DVT     Sinus bradycardia  - repeat EKG     Vitamin D deficiency   - start 50,000 q weekly     FULL CODE    Dispo - check pulse ox on ambulation, IS , d/c planning     
58 y/o F with PMHx as above, admitted with:    Acute hypoxemic respiratory failure  due to COVID-19 viral syndrome, and possible interstitial viral PNA   - pulse ox continuos    - O2 supplemental and maintain SpO2 between >92% on 2.5 L   - IV decadron 6mg IV qd for 5-10 days  with pepcid , CRP 9--> 8   - C/w remdesivir. discussed risks and benefits with patient  - Obtain baseline and inflammatory markers. Monitor Q72hrs  - Limit use of NSAIDs  - Albuterol MDI to limit aerosolization  - DVT px: COVID patients to be start on LMWH as per recent data supporting hypercoagulable state especially with high dimers with no absolute CI to its use ie GI bleed or other stigmata of bleeding within last 3 months or PLTs < 50  - nasal saline spray     Leg edema   - doppler - neg for DVT     Sinus bradycardia  - repeat EKG     Vitamin D deficiency   - start 50,000 q weekly     FULL CODE    Dispo - check pulse ox on ambulation, IS , d/c planning     
56 y/o F with PMHx as above, admitted with:    Acute hypoxemic respiratory failure  due to COVID-19 viral syndrome, and possible interstitial viral PNA   - C/w supplemental O2 and maintain SpO2 above>92%, currently on 2L NC  - IV decadron 6mg IV qd for 5-10 days  with pepcid   - C/w remdesivir #4. discussed risks and benefits with patient  - D-dimer and inflammatory markers negative   - Limit use of NSAIDs  - Albuterol MDI to limit aerosolization  - DVT px: COVID patients to be start on LMWH as per recent data supporting hypercoagulable state especially with high dimers with no absolute CI to its use ie GI bleed or other stigmata of bleeding within last 3 months or PLTs < 50  - nasal saline spray   - monitor LFTs while on RDV    Leg edema   - doppler - neg for DVT     Sinus bradycardia  -improved    Vitamin D deficiency   - c/w 50,000 q weekly     FULL CODE    Dispo - check pulse ox on ambulation, d/c planning

## 2022-08-09 NOTE — PHYSICAL THERAPY INITIAL EVALUATION ADULT - PERTINENT HX OF CURRENT PROBLEM, REHAB EVAL
58 yo F with no pertinent medical history  admitted on 8/6/22 with  sob and flu like symptoms. Diagnosed with covid on tuesday. Since then sob and generalized malaise with nausea. Hypoxic to 85% in ED on ra corrected to 93% on 2L.

## 2022-08-09 NOTE — PHYSICAL THERAPY INITIAL EVALUATION ADULT - DIAGNOSIS, PT EVAL
Acute hypoxemic respiratory failure  due to COVID-19 viral syndrome, and possible interstitial viral PNA

## 2022-08-09 NOTE — PROGRESS NOTE ADULT - SUBJECTIVE AND OBJECTIVE BOX
Chief complaint of sob     HPI:  56 yo F with no pertinent medical history  admitted on 8/6/22 with  sob and flu like symptoms. Diagnosed with covid on tuesday. Since then sob and generalized malaise with nausea. Hypoxic to 85% in ED on ra corrected to 93% on 2L. CXR without infiltrates. dimer and inflammatory markers negative     8/9 -   Patient seen and examined at bedside earlier today, reports SOB/cough improving , denies any other current complaints. On day #4 of remdesivir. VSS with O2 > 97% while on 2L NC. Pending O2 with ambulation eval     Review of system- Rest of the review of system are negative except mentioned in HPI    Objective:   Vital Signs Last 24 Hrs  T(C): 36.7 (09 Aug 2022 07:46), Max: 37.2 (08 Aug 2022 21:57)  T(F): 98.1 (09 Aug 2022 07:46), Max: 98.9 (08 Aug 2022 21:57)  HR: 52 (09 Aug 2022 07:46) (50 - 72)  BP: 154/92 (09 Aug 2022 07:46) (146/96 - 154/92)  BP(mean): --  RR: 17 (09 Aug 2022 07:46) (17 - 18)  SpO2: 98% (08 Aug 2022 21:57) (98% - 98%)    Parameters below as of 09 Aug 2022 07:46  Patient On (Oxygen Delivery Method): nasal cannula  O2 Flow (L/min): 2    Physical exam:   General : NAD, appear to be of stated age , well groomed   NERVOUS SYSTEM:  Alert & Oriented X3, non- focal exam, Motor Strength 5/5 B/L upper and lower extremities; DTRs 2+ intact and symmetric  HEAD:  Atraumatic, Normocephalic  EYES: EOMI, PERRLA, conjunctiva and sclera clear  HEENT: Moist mucous membranes, Supple neck , No JVD  CHEST: CTAB,  No rales, no rhonchi, no wheezing  HEART: Regular rate and rhythm; No murmurs, no rubs or gallops  ABDOMEN: Soft, Non-tender, Non-distended; Bowel sounds present, no guarding , no peritoneal irritation   GENITOURINARY- Voiding, no suprapubic tenderness  EXTREMITIES:  2+ Peripheral Pulses, No clubbing, cyanosis,  +b/l edema  MUSCULOSKELETAL:- No muscle tenderness, Muscle tone normal, No joint tenderness, no Joint swelling,  Joint ROM –normal   SKIN-no rash, no lesion    LABS:  Glucose 148 [70 - 99]  CO2 total 28 [22 - 31]  Chloride 106 [96 - 108]  Sodium 141 [135 - 145]  Potassium 3.7 [3.5 - 5.3]  Calcium 9.0 [8.5 - 10.1]  Creatinine 0.68 [0.50 - 1.30]  BUN 17 [7 - 23]  eGFR 102  Anion gap 7 [5 - 17]  AST 40 [15 - 37]  ALT 46 [12 - 78]  Alk phos 88 [40 - 120]  Albumin 3.0 [3.3 - 5.0]      RADIOLOGY & ADDITIONAL TESTS: all reviewed   EKG  reviewed   < from: US Duplex Venous Lower Ext Complete, Bilateral (08.07.22 @ 13:32) >  IMPRESSION:  No evidence of deep venous thrombosis in either lower extremity.    < end of copied text >  CXR 8/6/22 - no infiltrates, official reading pending     Current medications:  acetaminophen     Tablet .. 650 milliGRAM(s) Oral every 4 hours PRN  ALBUTerol    90 MICROgram(s) HFA Inhaler 2 Puff(s) Inhalation every 4 hours PRN  benzonatate 100 milliGRAM(s) Oral three times a day PRN  dexAMETHasone  Injectable 6 milliGRAM(s) IV Push daily  enoxaparin Injectable 40 milliGRAM(s) SubCutaneous every 12 hours  ergocalciferol 08501 Unit(s) Oral every week  famotidine    Tablet 20 milliGRAM(s) Oral daily  ondansetron Injectable 8 milliGRAM(s) IV Push every 8 hours PRN  remdesivir  IVPB   IV Intermittent   remdesivir  IVPB 100 milliGRAM(s) IV Intermittent every 24 hours  sodium chloride 0.65% Nasal 1 Spray(s) Both Nostrils every 2 hours PRN            
 chief complaint of sob     HPI:  58 yo F with no pertinent medical history  admitted on 8/6/22 with  sob and flu like symptoms. Diagnosed with covid on tuesday. Since then sob and generalized malaise with nausea. Hypoxic to 85% in ED on ra corrected to 93% on 2L.  CXR without infiltrates. Awaiting dimer and inflammatory markers    8/7 -   Patient seen and examined at bedside earlier today, + dyspnea improved, + dry cough, denies cp, palpitations,+ chronic leg swelling due to lymphedema with some leg discomfort, + nausea improved , tolerating po intake, on 2.5 L O2     Review of system- Rest of the review of system are negative except mentioned in HPI    Objective: Vital sings reviewed for last 24 h  T(C): 37.1 (08-07-22 @ 15:50), Max: 37.1 (08-07-22 @ 15:50)  HR: 64 (08-07-22 @ 15:50) (56 - 64)  BP: 130/69 (08-07-22 @ 15:50) (130/69 - 159/63)  RR: 18 (08-07-22 @ 15:50) (18 - 19)  SpO2: 98% (08-07-22 @ 15:50) (97% - 100%)  Wt(kg): --  Daily     Daily   CAPILLARY BLOOD GLUCOSE    Physical exam:   General : NAD, appear to be of stated age , well groomed   NERVOUS SYSTEM:  Alert & Oriented X3, non- focal exam, Motor Strength 5/5 B/L upper and lower extremities; DTRs 2+ intact and symmetric  HEAD:  Atraumatic, Normocephalic  EYES: EOMI, PERRLA, conjunctiva and sclera clear  HEENT: Moist mucous membranes, Supple neck , No JVD  CHEST: BS decreased at bases,  No rales, no rhonchi, no wheezing  HEART: Regular rate and rhythm; No murmurs, no rubs or gallops  ABDOMEN: Soft, Non-tender, Non-distended; Bowel sounds present, no guarding , no peritoneal irritation   GENITOURINARY- Voiding, no suprapubic tenderness  EXTREMITIES:  2+ Peripheral Pulses, No clubbing, cyanosis,   edema  MUSCULOSKELETAL:- No muscle tenderness, Muscle tone normal, No joint tenderness, no Joint swelling,  Joint ROM –normal   SKIN-no rash, no lesion    LABS: all reviewed                        14.4   6.69  )-----------( 223      ( 07 Aug 2022 09:18 )             41.8     08-07    140  |  109<H>  |  18  ----------------------------<  141<H>  4.1   |  25  |  0.76    Ca    9.1      07 Aug 2022 09:18    TPro  6.6  /  Alb  3.0<L>  /  TBili  0.3  /  DBili  x   /  AST  12<L>  /  ALT  12  /  AlkPhos  94  08-07          RECENT CULTURES:    RADIOLOGY & ADDITIONAL TESTS: all reviewed   EKG  reviewed   < from: US Duplex Venous Lower Ext Complete, Bilateral (08.07.22 @ 13:32) >  IMPRESSION:  No evidence of deep venous thrombosis in either lower extremity.    < end of copied text >  CXR 8/6/22 - no infiltrates, official reading pending     Current medications:  acetaminophen     Tablet .. 650 milliGRAM(s) Oral every 4 hours PRN  ALBUTerol    90 MICROgram(s) HFA Inhaler 2 Puff(s) Inhalation every 4 hours PRN  benzonatate 100 milliGRAM(s) Oral three times a day PRN  dexAMETHasone  Injectable 6 milliGRAM(s) IV Push daily  enoxaparin Injectable 40 milliGRAM(s) SubCutaneous every 12 hours  ergocalciferol 18290 Unit(s) Oral every week  famotidine    Tablet 20 milliGRAM(s) Oral daily  ondansetron Injectable 8 milliGRAM(s) IV Push every 8 hours PRN  remdesivir  IVPB   IV Intermittent   remdesivir  IVPB 100 milliGRAM(s) IV Intermittent every 24 hours  sodium chloride 0.65% Nasal 1 Spray(s) Both Nostrils every 2 hours PRN            
 chief complaint of sob     HPI:  56 yo F with no pertinent medical history  admitted on 8/6/22 with  sob and flu like symptoms. Diagnosed with covid on tuesday. Since then sob and generalized malaise with nausea. Hypoxic to 85% in ED on ra corrected to 93% on 2L.  CXR without infiltrates. Awaiting dimer and inflammatory markers    8/8 -   Patient seen and examined at bedside earlier today, reports continued mild SOB/cough , denies any other current complaints. On day #3 of remdesivir. VSS with O2 > 97% while on 2L NC.     Review of system- Rest of the review of system are negative except mentioned in HPI    Objective:   Vital Signs Last 24 Hrs  T(C): 36.8 (08 Aug 2022 15:32), Max: 36.8 (08 Aug 2022 01:08)  T(F): 98.2 (08 Aug 2022 15:32), Max: 98.2 (08 Aug 2022 01:08)  HR: 50 (08 Aug 2022 15:32) (50 - 68)  BP: 150/68 (08 Aug 2022 15:32) (150/68 - 158/91)  BP(mean): --  RR: 18 (08 Aug 2022 15:32) (18 - 18)  SpO2: 98% (08 Aug 2022 15:32) (98% - 100%)    Parameters below as of 08 Aug 2022 15:32  Patient On (Oxygen Delivery Method): nasal cannula  O2 Flow (L/min): 2    Physical exam:   General : NAD, appear to be of stated age , well groomed   NERVOUS SYSTEM:  Alert & Oriented X3, non- focal exam, Motor Strength 5/5 B/L upper and lower extremities; DTRs 2+ intact and symmetric  HEAD:  Atraumatic, Normocephalic  EYES: EOMI, PERRLA, conjunctiva and sclera clear  HEENT: Moist mucous membranes, Supple neck , No JVD  CHEST: BS decreased at bases,  No rales, no rhonchi, no wheezing  HEART: Regular rate and rhythm; No murmurs, no rubs or gallops  ABDOMEN: Soft, Non-tender, Non-distended; Bowel sounds present, no guarding , no peritoneal irritation   GENITOURINARY- Voiding, no suprapubic tenderness  EXTREMITIES:  2+ Peripheral Pulses, No clubbing, cyanosis,   edema  MUSCULOSKELETAL:- No muscle tenderness, Muscle tone normal, No joint tenderness, no Joint swelling,  Joint ROM –normal   SKIN-no rash, no lesion    LABS:  Glucose 94 [70 - 99]  CO2 total 28 [22 - 31]  Chloride 107 [96 - 108]  Sodium 140 [135 - 145]  Potassium 3.6 [3.5 - 5.3]  Calcium 8.8 [8.5 - 10.1]  Creatinine 0.60 [0.50 - 1.30]  BUN 20 [7 - 23]  eGFR 105  Anion gap 5 [5 - 17]  AST 10 [15 - 37]  ALT 14 [12 - 78]  Alk phos 81 [40 - 120]  Albumin 3.0 [3.3 - 5.0]    WBC 5.28 [3.80 - 10.50]  Hemoglobin 14.8 [11.5 - 15.5]  Hematocrit 43.6 [34.5 - 45.0]  Platelets 226 [150 - 400]      RADIOLOGY & ADDITIONAL TESTS: all reviewed   EKG  reviewed   < from: US Duplex Venous Lower Ext Complete, Bilateral (08.07.22 @ 13:32) >  IMPRESSION:  No evidence of deep venous thrombosis in either lower extremity.    < end of copied text >  CXR 8/6/22 - no infiltrates, official reading pending     Current medications:  acetaminophen     Tablet .. 650 milliGRAM(s) Oral every 4 hours PRN  ALBUTerol    90 MICROgram(s) HFA Inhaler 2 Puff(s) Inhalation every 4 hours PRN  benzonatate 100 milliGRAM(s) Oral three times a day PRN  dexAMETHasone  Injectable 6 milliGRAM(s) IV Push daily  enoxaparin Injectable 40 milliGRAM(s) SubCutaneous every 12 hours  ergocalciferol 14350 Unit(s) Oral every week  famotidine    Tablet 20 milliGRAM(s) Oral daily  ondansetron Injectable 8 milliGRAM(s) IV Push every 8 hours PRN  remdesivir  IVPB   IV Intermittent   remdesivir  IVPB 100 milliGRAM(s) IV Intermittent every 24 hours  sodium chloride 0.65% Nasal 1 Spray(s) Both Nostrils every 2 hours PRN

## 2022-08-10 ENCOUNTER — TRANSCRIPTION ENCOUNTER (OUTPATIENT)
Age: 57
End: 2022-08-10

## 2022-08-10 VITALS
SYSTOLIC BLOOD PRESSURE: 155 MMHG | RESPIRATION RATE: 18 BRPM | DIASTOLIC BLOOD PRESSURE: 98 MMHG | OXYGEN SATURATION: 98 % | HEART RATE: 65 BPM | TEMPERATURE: 98 F

## 2022-08-10 LAB
ALBUMIN SERPL ELPH-MCNC: 3 G/DL — LOW (ref 3.3–5)
ALP SERPL-CCNC: 81 U/L — SIGNIFICANT CHANGE UP (ref 40–120)
ALT FLD-CCNC: 66 U/L — SIGNIFICANT CHANGE UP (ref 12–78)
ANION GAP SERPL CALC-SCNC: 3 MMOL/L — LOW (ref 5–17)
AST SERPL-CCNC: 39 U/L — HIGH (ref 15–37)
BILIRUB SERPL-MCNC: 0.3 MG/DL — SIGNIFICANT CHANGE UP (ref 0.2–1.2)
BUN SERPL-MCNC: 15 MG/DL — SIGNIFICANT CHANGE UP (ref 7–23)
CALCIUM SERPL-MCNC: 9 MG/DL — SIGNIFICANT CHANGE UP (ref 8.5–10.1)
CHLORIDE SERPL-SCNC: 106 MMOL/L — SIGNIFICANT CHANGE UP (ref 96–108)
CO2 SERPL-SCNC: 31 MMOL/L — SIGNIFICANT CHANGE UP (ref 22–31)
CREAT SERPL-MCNC: 0.59 MG/DL — SIGNIFICANT CHANGE UP (ref 0.5–1.3)
EGFR: 106 ML/MIN/1.73M2 — SIGNIFICANT CHANGE UP
GLUCOSE SERPL-MCNC: 96 MG/DL — SIGNIFICANT CHANGE UP (ref 70–99)
POTASSIUM SERPL-MCNC: 3.9 MMOL/L — SIGNIFICANT CHANGE UP (ref 3.5–5.3)
POTASSIUM SERPL-SCNC: 3.9 MMOL/L — SIGNIFICANT CHANGE UP (ref 3.5–5.3)
PROT SERPL-MCNC: 6.6 GM/DL — SIGNIFICANT CHANGE UP (ref 6–8.3)
SODIUM SERPL-SCNC: 140 MMOL/L — SIGNIFICANT CHANGE UP (ref 135–145)

## 2022-08-10 PROCEDURE — 99239 HOSP IP/OBS DSCHRG MGMT >30: CPT

## 2022-08-10 RX ORDER — ALBUTEROL 90 UG/1
2 AEROSOL, METERED ORAL
Qty: 1 | Refills: 0
Start: 2022-08-10

## 2022-08-10 RX ORDER — ACETAMINOPHEN 500 MG
2 TABLET ORAL
Qty: 0 | Refills: 0 | DISCHARGE
Start: 2022-08-10

## 2022-08-10 RX ADMIN — Medication 6 MILLIGRAM(S): at 10:00

## 2022-08-10 RX ADMIN — REMDESIVIR 500 MILLIGRAM(S): 5 INJECTION INTRAVENOUS at 10:00

## 2022-08-10 RX ADMIN — FAMOTIDINE 20 MILLIGRAM(S): 10 INJECTION INTRAVENOUS at 09:59

## 2022-08-10 RX ADMIN — ENOXAPARIN SODIUM 40 MILLIGRAM(S): 100 INJECTION SUBCUTANEOUS at 10:00

## 2022-08-10 NOTE — DISCHARGE NOTE NURSING/CASE MANAGEMENT/SOCIAL WORK - NSDCPEFALRISK_GEN_ALL_CORE
For information on Fall & Injury Prevention, visit: [email or web address] OR  [email or web address] OR  https://www.cdc.gov/steadi/patient.html

## 2022-08-10 NOTE — DISCHARGE NOTE PROVIDER - HOSPITAL COURSE
Physical Exam:  Vital Signs Last 24 Hrs  T(C): 36.4 (10 Aug 2022 08:30), Max: 36.7 (09 Aug 2022 22:59)  T(F): 97.6 (10 Aug 2022 08:30), Max: 98.1 (09 Aug 2022 22:59)  HR: 54 (10 Aug 2022 08:30) (54 - 73)  BP: 145/90 (10 Aug 2022 08:30) (142/78 - 145/90)  BP(mean): --  RR: 18 (10 Aug 2022 08:30) (17 - 18)  SpO2: 98% (10 Aug 2022 08:30) (96% - 98%)    Parameters below as of 10 Aug 2022 08:30  Patient On (Oxygen Delivery Method): room air    Constitutional: NAD, awake and alert  HEENT: PERRLA, EOMI, MMM  Respiratory: Breath sounds are clear bilaterally, No wheezing, rales or rhonchi  Cardiovascular: S1 and S2, RRR, no murmurs, gallops or rubs  Gastrointestinal: +BS, soft, non-tender, non-distended, no CVA tenderness  Extremities: +b/l LE lymphedema, +DP pulses b/l  Neurological: A&O x 3, no focal deficits  Musculoskeletal: 5/5 strength b/l upper and lower extremities  Skin: Normal, skin warm and dry    Labs:  Glucose 96 mg/dL  CO2 31 mmol/L  Chloride  Sodium 140 mmol/L  Potassium 3.9 mmol/L  Calcium 9.0 mg/dL  Creatinine 0.59 mg/dL  BUN 15 mg/dL  eGFR 106 mL/min/1.73m2  Anion Gap 3 mmol/L    Assessment/Plan:  58 y/o F with PMHx as above, admitted with:    Acute hypoxemic respiratory failure  due to COVID-19 viral syndrome   - S/p supplemental O2 via 2L NC, now breathing comfortable on room air   - S/p IV decadron 6mg x4 days, will continue PO decadron to complete 10 day course   - S/p 5 day course of Remdesivir - tolerated well   - D-dimer and inflammatory markers negative   - Limit use of NSAIDs  - Albuterol MDI PRN to limit aerosolization  - Received lovenox for DVT ppx   - nasal saline spray     Leg edema   - doppler - neg for DVT     Sinus bradycardia  - improved    Vitamin D deficiency   - c/w Vit D supplementation     Dispo: discharge to home in stable condition    Final diagnosis, treatment plan, and follow-up recommendations were discussed and explained to the patient. The patient was given an opportunity to ask questions concerning the diagnosis and treatment plan. The patient acknowledged understanding of the diagnosis, treatment, and follow-up recommendations. The patient was advised to seek urgent care upon discharge if worsening symptoms develop prior to scheduled follow-up. Time spent on discharge included time with the patient, and also coordinating discharge care as outlined below.    Total time spent: 50 min

## 2022-08-10 NOTE — DISCHARGE NOTE PROVIDER - CARE PROVIDER_API CALL
Opal Doss)  Internal Medicine  [address], [place] [address]  Phone: [phone]  Fax: [phone]  Follow Up Time: 1 month

## 2022-08-10 NOTE — DISCHARGE NOTE PROVIDER - ATTENDING DISCHARGE PHYSICAL EXAMINATION:
Saw and evaluated patient  T(C): 36.4 (08-10-22 @ 15:32), Max: 36.7 (08-09-22 @ 22:59)  HR: 65 (08-10-22 @ 15:32) (54 - 65)  BP: 155/98 (08-10-22 @ 15:32) (142/78 - 155/98)  RR: 18 (08-10-22 @ 15:32) (17 - 18)  SpO2: 98% (08-10-22 @ 15:32) (96% - 98%)  No respiratory distress and adequate saturaiton on RA at rest. Lungs CTA bilaterally

## 2022-08-10 NOTE — DISCHARGE NOTE NURSING/CASE MANAGEMENT/SOCIAL WORK - PATIENT PORTAL LINK FT
You can access the FollowMyHealth Patient Portal offered by [facility] by registering at the following website: [email or web address]. By joining [facility]’s FollowMyHealth portal, you will also be able to view your health information using other applications (apps) compatible with our system.

## 2022-08-10 NOTE — DISCHARGE NOTE PROVIDER - NSDCCPCAREPLAN_GEN_ALL_CORE_FT
PRINCIPAL DISCHARGE DIAGNOSIS  Diagnosis: 2019 novel coronavirus disease (COVID-19)  Assessment and Plan of Treatment: WHAT IS CORONAVIRUS? Coronavirus (COVID19) is a disease which generally causes respiratory symptoms however, it can also affect other organs like the brain or heart. Blood vessels can also be affected leading to blood clots.  THINGS TO DO: (1) Limit the spread by avoiding close personal contact with an infected individual (2) Avoid large gatherings or crowds (3) Practice social distancing by maintaining at least 6 feet between you and others (4) Wear a mask (5) Prevent the spread of germs – wash your hands often and cover your mouth when you cough  MONITOR THESE SIGNS AND SYMPTOMS: (1) Shortness of breath/trouble breathing at rest (2) Chest pain or pressure (3) Confusion (4) Fever > 100.4. If you experience any of these, DO alert your primary care provider, or return to the Emergency Department if you feel very sick.   You received 5 total days of Remdesivir and IV steroids   Complete 5 additional days of Decadron 6mg (steroid) which was sent to your pharmacy.   Follow up with your primary care provider in the next 2-4 weeks after discharge.      SECONDARY DISCHARGE DIAGNOSES  Diagnosis: 2019 novel coronavirus disease (COVID-19)  Assessment and Plan of Treatment:     Diagnosis: Acute respiratory failure with hypoxia  Assessment and Plan of Treatment:

## 2022-08-10 NOTE — DISCHARGE NOTE PROVIDER - NSDCMRMEDTOKEN_GEN_ALL_CORE_FT
acetaminophen 325 mg oral tablet: 2 tab(s) orally every 4 hours, As needed, Temp greater or equal to 38.5C (101.3F)  albuterol 90 mcg/inh inhalation aerosol: 2 puff(s) inhaled every 4 hours, As needed, Shortness of Breath and/or Wheezing  dexamethasone 6 mg oral tablet: 1 tab(s) orally once a day   ergocalciferol 1.25 mg (50,000 intl units) oral capsule: 1 cap(s) orally once a week    First dose 8/15/22, once a week thereafter

## 2022-08-11 RX ORDER — DEXAMETHASONE 0.5 MG/5ML
1 ELIXIR ORAL
Qty: 5 | Refills: 0
Start: 2022-08-11 | End: 2022-08-15

## 2022-08-15 RX ORDER — ERGOCALCIFEROL 1.25 MG/1
1 CAPSULE ORAL
Qty: 4 | Refills: 0
Start: 2022-08-15

## 2022-08-17 DIAGNOSIS — J96.01 ACUTE RESPIRATORY FAILURE WITH HYPOXIA: ICD-10-CM

## 2022-08-17 DIAGNOSIS — U07.1 COVID-19: ICD-10-CM

## 2022-08-17 DIAGNOSIS — E55.9 VITAMIN D DEFICIENCY, UNSPECIFIED: ICD-10-CM

## 2022-08-17 DIAGNOSIS — R60.0 LOCALIZED EDEMA: ICD-10-CM

## 2022-08-17 DIAGNOSIS — J84.9 INTERSTITIAL PULMONARY DISEASE, UNSPECIFIED: ICD-10-CM

## 2022-08-17 DIAGNOSIS — R00.1 BRADYCARDIA, UNSPECIFIED: ICD-10-CM

## 2022-10-12 ENCOUNTER — OFFICE (OUTPATIENT)
Dept: URBAN - METROPOLITAN AREA CLINIC 12 | Facility: CLINIC | Age: 57
Setting detail: OPHTHALMOLOGY
End: 2022-10-12
Payer: COMMERCIAL

## 2022-10-12 DIAGNOSIS — D31.31: ICD-10-CM

## 2022-10-12 DIAGNOSIS — H01.004: ICD-10-CM

## 2022-10-12 DIAGNOSIS — H01.001: ICD-10-CM

## 2022-10-12 DIAGNOSIS — H25.13: ICD-10-CM

## 2022-10-12 PROCEDURE — 92014 COMPRE OPH EXAM EST PT 1/>: CPT | Performed by: OPTOMETRIST

## 2022-10-12 ASSESSMENT — KERATOMETRY
OD_K2POWER_DIOPTERS: 42.50
OS_K1POWER_DIOPTERS: 42.50
OS_AXISANGLE_DEGREES: 118
OS_K2POWER_DIOPTERS: 42.75
OD_K1POWER_DIOPTERS: 41.75
OD_AXISANGLE_DEGREES: 064
METHOD_AUTO_MANUAL: AUTO

## 2022-10-12 ASSESSMENT — REFRACTION_AUTOREFRACTION
OS_SPHERE: +1.00
OD_SPHERE: +1.25
OD_CYLINDER: -1.00
OS_CYLINDER: -1.00
OD_AXIS: 093
OS_AXIS: 099

## 2022-10-12 ASSESSMENT — TONOMETRY
OD_IOP_MMHG: 16
OS_IOP_MMHG: 18

## 2022-10-12 ASSESSMENT — REFRACTION_CURRENTRX
OD_VPRISM_DIRECTION: SV
OS_ADD: +2.00
OD_ADD: +2.00
OS_VPRISM_DIRECTION: SV
OD_OVR_VA: 20/
OS_OVR_VA: 20/

## 2022-10-12 ASSESSMENT — SPHEQUIV_DERIVED
OD_SPHEQUIV: 0.75
OS_SPHEQUIV: 0.5

## 2022-10-12 ASSESSMENT — REFRACTION_MANIFEST
OD_SPHERE: +0.25
OS_CYLINDER: SPH
OD_CYLINDER: SPH
OD_VA1: 20/20-
OD_ADD: +2.00
OS_VA1: 20/20-
OS_SPHERE: PLANO
OS_ADD: +2.00

## 2022-10-12 ASSESSMENT — AXIALLENGTH_DERIVED
OS_AL: 23.7192
OD_AL: 23.8067

## 2022-10-12 ASSESSMENT — CONFRONTATIONAL VISUAL FIELD TEST (CVF)
OS_FINDINGS: FULL
OD_FINDINGS: FULL

## 2022-10-12 ASSESSMENT — LID EXAM ASSESSMENTS
OS_BLEPHARITIS: T
OD_BLEPHARITIS: T

## 2022-10-12 ASSESSMENT — VISUAL ACUITY
OD_BCVA: 20/25-
OS_BCVA: 20/25

## 2023-03-03 ENCOUNTER — OUTPATIENT (OUTPATIENT)
Dept: OUTPATIENT SERVICES | Facility: HOSPITAL | Age: 58
LOS: 1 days | End: 2023-03-03
Payer: COMMERCIAL

## 2023-03-03 VITALS
WEIGHT: 293 LBS | SYSTOLIC BLOOD PRESSURE: 147 MMHG | TEMPERATURE: 98 F | RESPIRATION RATE: 14 BRPM | HEART RATE: 75 BPM | OXYGEN SATURATION: 95 % | HEIGHT: 63 IN | DIASTOLIC BLOOD PRESSURE: 88 MMHG

## 2023-03-03 DIAGNOSIS — Z98.890 OTHER SPECIFIED POSTPROCEDURAL STATES: Chronic | ICD-10-CM

## 2023-03-03 DIAGNOSIS — S83.242A OTHER TEAR OF MEDIAL MENISCUS, CURRENT INJURY, LEFT KNEE, INITIAL ENCOUNTER: ICD-10-CM

## 2023-03-03 DIAGNOSIS — Z98.891 HISTORY OF UTERINE SCAR FROM PREVIOUS SURGERY: Chronic | ICD-10-CM

## 2023-03-03 DIAGNOSIS — S83.282A OTHER TEAR OF LATERAL MENISCUS, CURRENT INJURY, LEFT KNEE, INITIAL ENCOUNTER: ICD-10-CM

## 2023-03-03 DIAGNOSIS — Z01.818 ENCOUNTER FOR OTHER PREPROCEDURAL EXAMINATION: ICD-10-CM

## 2023-03-03 DIAGNOSIS — K08.409 PARTIAL LOSS OF TEETH, UNSPECIFIED CAUSE, UNSPECIFIED CLASS: Chronic | ICD-10-CM

## 2023-03-03 DIAGNOSIS — Z90.49 ACQUIRED ABSENCE OF OTHER SPECIFIED PARTS OF DIGESTIVE TRACT: Chronic | ICD-10-CM

## 2023-03-03 PROCEDURE — G0463: CPT

## 2023-03-03 NOTE — H&P PST ADULT - NEGATIVE ENMT SYMPTOMS
Denies any hearing aids or dentures/no sinus symptoms/no nasal congestion/no post-nasal discharge/no abnormal taste sensation

## 2023-03-03 NOTE — H&P PST ADULT - HISTORY OF PRESENT ILLNESS
57 year old female PMH  Asthma, Newly DX HTN (Does not remember what medication she is on as she just started it at night), Obesity, Lymphedema (bilateral lower extremities- notes eh has had it for the last 25 years since giving birth to her daughter; wears compression stockings), Osteoarthritis Bilateral Knees, COVID-19 X2 (2020 then August 2022 - was hospitalized  at [place]),  RIGHT Arm Fracture (H/O ORIF with hardware), Umbilical Hernia Repair; now with Other Tear Medial Meniscus Current Injury LEFT Knee Initial Encounter; Other Tear lateral meniscus Current Injury LEFT Knee Initial Encounter; presents  today for PST prior to LEFT Knee Arthroscopy - Possible Partial Medial and lateral menisectomy Repair - Possible Debridement W/Chondroplasty with Dr Fabian Kim on 3/10/2023.     Pt notes both H/O osteoarthritis of bilateral knees as well as torn meniscus which she notes happened on 10/17/22. Pt notes she was at work and she turned abruptly as a customer tapped her on her shoulder and notes "I heard my left knee snap." Since then she had LEFT Knee pain. Pt notes she has received one Cortisone injection with little relief noted. Notes decreased ROM and strength as well as swelling. Rates pain today #5/10 on pain scale. Has been taking Advil alternating with Naproxen as needed. MRI notes Torn LEFT meniscus. Following consult, exam and discussions with Dr Kim regarding treatment options pt is electing for scheduled procedure.  57 year old female PMH  Asthma, Newly DX HTN (Does not remember what medication she is on as she just started it at night), Obesity, Lymphedema (bilateral lower extremities- notes eh has had it for the last 25 years since giving birth to her daughter; wears compression stockings), Osteoarthritis Bilateral Knees, COVID-19 X2 (2020 then August 2022 - was hospitalized  at [place]),  RIGHT Arm Fracture (H/O ORIF with hardware), Umbilical Hernia Repair; now with Other Tear Medial Meniscus Current Injury LEFT Knee Initial Encounter; Other Tear lateral meniscus Current Injury LEFT Knee Initial Encounter; presents  today for PST prior to LEFT Knee Arthroscopy - Possible Partial Medial and lateral menisectomy Repair - Possible Debridement W/Chondroplasty with Dr Fabian Kim on 3/10/2023.     Pt notes both H/O osteoarthritis of bilateral knees as well as torn meniscus which she notes happened on 10/17/22. Pt notes she was at work and she turned abruptly as a customer tapped her on her shoulder and notes "I heard my left knee snap." Since then she had LEFT Knee pain. Pt notes she has received one Cortisone injection with little relief noted. Notes decreased ROM and strength as well as swelling. Rates pain today #5/10 on pain scale. Has been taking Advil alternating with Naproxen as needed. MRI notes Torn LEFT meniscus. Following consult, exam and discussions with Dr Kim regarding treatment options pt is electing for scheduled procedure. Pt notes she this is a workers comp case.    OF NOTE: Pt notes she was originally scheduled to have procedure at [facility]  today 3/3/23 however she was called yesterday and changed to have procedure done here at [place] secondary to increased BMI.

## 2023-03-03 NOTE — H&P PST ADULT - LAST STRESS TEST
Notes she had stress test at least 5 years ago - does not remember name of cardiologist she saw - someone in [place]

## 2023-03-03 NOTE — H&P PST ADULT - LAST ECHOCARDIOGRAM
Notes she had an ECHO at least 5 years ago - states she was sent due to the Lymphedema had workup which she notes was negative - does not remember name of cardiologist she saw - knows it was someone in [place]

## 2023-03-03 NOTE — H&P PST ADULT - BSA (M2)
Refill Approved    Rx renewed per Medication Renewal Policy. Medication was last renewed on 2/19/20.    Debbie Joseph, [facility] Triage/Med Refill 5/18/2020     Requested Prescriptions   Pending Prescriptions Disp Refills     chlorthalidone (HYGROTEN) 25 MG tablet [Pharmacy Med Name: Chlorthalidone 25 MG Oral Tablet] 90 tablet 0     Sig: Take 1 tablet by mouth once daily       Diuretics/Combination Diuretics Refill Protocol  Passed - 5/15/2020  8:59 AM        Passed - Visit with PCP or prescribing provider visit in past 12 months     Last office visit with prescriber/PCP: 1/6/2020 Gabrielle Orozco MD OR same dept: 1/6/2020 Gabrielle Orozco MD OR same specialty: 1/6/2020 Gabrielle Orozco MD  Last physical: Visit date not found Last MTM visit: Visit date not found   Next visit within 3 mo: Visit date not found  Next physical within 3 mo: Visit date not found  Prescriber OR PCP: Gabrielle Orozco MD  Last diagnosis associated with med order: 1. Essential hypertension  - chlorthalidone (HYGROTEN) 25 MG tablet [Pharmacy Med Name: Chlorthalidone 25 MG Oral Tablet]; TAKE 1 TABLET BY MOUTH ONCE DAILY  Dispense: 90 tablet; Refill: 0    If protocol passes may refill for 12 months if within 3 months of last provider visit (or a total of 15 months).             Passed - Serum Potassium in past 12 months      Lab Results   Component Value Date    Potassium 3.9 06/05/2019             Passed - Serum Sodium in past 12 months      Lab Results   Component Value Date    Sodium 132 (L) 06/05/2019             Passed - Blood pressure on file in past 12 months     BP Readings from Last 1 Encounters:   05/08/20 134/62             Passed - Serum Creatinine in past 12 months      Creatinine   Date Value Ref Range Status   06/05/2019 0.74 0.60 - 1.10 mg/dL Final                            
2.28

## 2023-03-03 NOTE — H&P PST ADULT - NSICDXPASTMEDICALHX_GEN_ALL_CORE_FT
PAST MEDICAL HISTORY:  2019 novel coronavirus disease (COVID-19)     Asthma     Hypertension     Lymphedema     Obesity     Osteoarthritis     Other tear of lateral meniscus of left knee, initial encounter     Other tear of medial meniscus of left knee, initial encounter

## 2023-03-03 NOTE — H&P PST ADULT - MUSCULOSKELETAL
LEFT Knee - Tenderness on exam/decreased ROM/decreased ROM due to pain/normal gait/decreased strength negative

## 2023-03-03 NOTE — H&P PST ADULT - PROBLEM SELECTOR PLAN 1
PST labs, EKG and medical clearance on chart from PCP Dr Jerrod Plummer. Pt instructed to stop any NSAIDS/Herbal Supplements/Advil/Naproxen between now and procedure. May take Tylenol if needed for any pain between now and procedure. She will continue her BP medication at night as she has been normally doing. Morning of procedure she was instructed to use her Albuterol Inhaler prior to coming to hospital. COVID swab appointment made for pt at the [place] lab on 3/6/23 @ 11AM. RX for COVID swab given to pt to bring with her day of swab. Pre-op instructions as well as pre-op wash instructions given to pt with understanding verbalized. All questions addressed with pt prior to her leaving the PST department today.

## 2023-03-03 NOTE — H&P PST ADULT - NSICDXPASTSURGICALHX_GEN_ALL_CORE_FT
PAST SURGICAL HISTORY:  H/O  section     H/O dilation and curettage     H/O umbilical hernia repair     H/O wisdom tooth extraction     History of laparoscopic cholecystectomy     S/P ORIF (open reduction internal fixation) fracture

## 2023-03-03 NOTE — H&P PST ADULT - ASSESSMENT
57 year old female PMH  Asthma, Newly DX HTN (Does not remember what medication she is on as she just started it at night), Obesity, Lymphedema (bilateral lower extremities- notes eh has had it for the last 25 years since giving birth to her daughter; wears compression stockings), Osteoarthritis Bilateral Knees, COVID-19 X2 (2020 then August 2022 - was hospitalized  at [place]),  RIGHT Arm Fracture (H/O ORIF with hardware), Umbilical Hernia Repair; now with Other Tear Medial Meniscus Current Injury LEFT Knee Initial Encounter; Other Tear lateral meniscus Current Injury LEFT Knee Initial Encounter; scheduled for LEFT Knee Arthroscopy - Possible Partial Medial and lateral menisectomy Repair - Possible Debridement W/Chondroplasty with Dr Fabian Kim on 3/10/2023.

## 2023-03-03 NOTE — H&P PST ADULT - NSICDXFAMILYHX_GEN_ALL_CORE_FT
FAMILY HISTORY:  Father  Still living? No  Family history of type 2 diabetes mellitus in father, Age at diagnosis: Age Unknown    Mother  Still living? Yes, Estimated age: 71-80  Family history of type 2 diabetes mellitus in mother, Age at diagnosis: Age Unknown

## 2023-03-09 ENCOUNTER — TRANSCRIPTION ENCOUNTER (OUTPATIENT)
Age: 58
End: 2023-03-09

## 2023-03-09 NOTE — ASU PATIENT PROFILE, ADULT - FALL HARM RISK - HARM RISK INTERVENTIONS

## 2023-03-10 ENCOUNTER — TRANSCRIPTION ENCOUNTER (OUTPATIENT)
Age: 58
End: 2023-03-10

## 2023-03-10 ENCOUNTER — OUTPATIENT (OUTPATIENT)
Dept: OUTPATIENT SERVICES | Facility: HOSPITAL | Age: 58
LOS: 1 days | End: 2023-03-10
Payer: COMMERCIAL

## 2023-03-10 VITALS
TEMPERATURE: 98 F | WEIGHT: 293 LBS | HEART RATE: 84 BPM | OXYGEN SATURATION: 93 % | DIASTOLIC BLOOD PRESSURE: 76 MMHG | HEIGHT: 63 IN | RESPIRATION RATE: 19 BRPM | SYSTOLIC BLOOD PRESSURE: 136 MMHG

## 2023-03-10 VITALS
OXYGEN SATURATION: 96 % | HEART RATE: 72 BPM | DIASTOLIC BLOOD PRESSURE: 68 MMHG | RESPIRATION RATE: 15 BRPM | SYSTOLIC BLOOD PRESSURE: 125 MMHG | TEMPERATURE: 98 F

## 2023-03-10 DIAGNOSIS — Z98.891 HISTORY OF UTERINE SCAR FROM PREVIOUS SURGERY: Chronic | ICD-10-CM

## 2023-03-10 DIAGNOSIS — Z98.890 OTHER SPECIFIED POSTPROCEDURAL STATES: Chronic | ICD-10-CM

## 2023-03-10 DIAGNOSIS — Z90.49 ACQUIRED ABSENCE OF OTHER SPECIFIED PARTS OF DIGESTIVE TRACT: Chronic | ICD-10-CM

## 2023-03-10 DIAGNOSIS — S83.242A OTHER TEAR OF MEDIAL MENISCUS, CURRENT INJURY, LEFT KNEE, INITIAL ENCOUNTER: ICD-10-CM

## 2023-03-10 DIAGNOSIS — K08.409 PARTIAL LOSS OF TEETH, UNSPECIFIED CAUSE, UNSPECIFIED CLASS: Chronic | ICD-10-CM

## 2023-03-10 DIAGNOSIS — S83.282A OTHER TEAR OF LATERAL MENISCUS, CURRENT INJURY, LEFT KNEE, INITIAL ENCOUNTER: ICD-10-CM

## 2023-03-10 PROCEDURE — 88304 TISSUE EXAM BY PATHOLOGIST: CPT

## 2023-03-10 PROCEDURE — 29880 ARTHRS KNE SRG MNISECTMY M&L: CPT | Mod: LT

## 2023-03-10 PROCEDURE — 88304 TISSUE EXAM BY PATHOLOGIST: CPT | Mod: 26

## 2023-03-10 RX ORDER — SODIUM CHLORIDE 9 MG/ML
1000 INJECTION, SOLUTION INTRAVENOUS
Refills: 0 | Status: DISCONTINUED | OUTPATIENT
Start: 2023-03-10 | End: 2023-03-10

## 2023-03-10 RX ORDER — IBUPROFEN 200 MG
2 TABLET ORAL
Qty: 0 | Refills: 0 | DISCHARGE

## 2023-03-10 RX ORDER — ASPIRIN/CALCIUM CARB/MAGNESIUM 324 MG
1 TABLET ORAL
Qty: 14 | Refills: 0
Start: 2023-03-10 | End: 2023-03-23

## 2023-03-10 RX ORDER — AMLODIPINE BESYLATE 2.5 MG/1
1 TABLET ORAL
Qty: 0 | Refills: 0 | DISCHARGE

## 2023-03-10 RX ORDER — MORPHINE SULFATE 50 MG/1
4 CAPSULE, EXTENDED RELEASE ORAL ONCE
Refills: 0 | Status: DISCONTINUED | OUTPATIENT
Start: 2023-03-10 | End: 2023-03-10

## 2023-03-10 RX ADMIN — SODIUM CHLORIDE 75 MILLILITER(S): 9 INJECTION, SOLUTION INTRAVENOUS at 09:31

## 2023-03-10 NOTE — ASU DISCHARGE PLAN (ADULT/PEDIATRIC) - ASU DC SPECIAL INSTRUCTIONSFT
Post-Operative Instructions: Left Knee Arthroscopy with Medial and Lateral Meniscectomies    POST-OP MEDICATIONS:     PAIN: You were given a prescription for narcotic pain medication. Take this medication as needed and as directed for breakthrough pain. You should try Extra Strength Tylenol first (500mg every 4 hours; not to exceed 3,000mg in 24 hours)/anti-inflammatories (such as Motrin) regularly to help control pain.      BLOOD CLOT PREVENTION: Anti-coagulation is critical to minimize the risk of a DVT (or blood clot).  We recommend you take Aspirin 81 mg once daily for 2 weeks as a precaution unless instructed otherwise.     ICE:  An ice device or ice bag (not directly touching the skin) should be utilized to reduce swelling and pain. Please ice every 3-4 hours for about 15-20 minutes each time until swelling subsides.     AMBULATION: You may weight bear as tolerated after surgery.     WOUND CARE:  Leave your surgical dressing on until Yury 3/12/23, after which you may remove your dressing and shower. Do not remove sutures, these will be removed in the office. Incisions may get wet but do not soak them and dry it off well. We recommend putting a sterile dry dressing/gauze or bandaid back over the incisions.     FOLLOW UP VISIT: If you do not already have a follow-up visit scheduled, then please call the office to schedule one for Tuesday 3/14/23.

## 2023-03-10 NOTE — ASU DISCHARGE PLAN (ADULT/PEDIATRIC) - CARE PROVIDER_API CALL
Fabian Kim)  Orthopaedic Surgery  [address], [address]  [place] [address]  Phone: [phone]  Fax: [phone]  Follow Up Time:

## 2023-03-10 NOTE — BRIEF OPERATIVE NOTE - NSICDXBRIEFPROCEDURE_GEN_ALL_CORE_FT
PROCEDURES:  Arthroscopy of left knee with partial meniscectomy 10-Mar-2023 12:41:19  Arvin Ragland

## 2023-03-10 NOTE — ASU DISCHARGE PLAN (ADULT/PEDIATRIC) - NS MD DC FALL RISK RISK
For information on Fall & Injury Prevention, visit: [email or web address] OR  [email or web address] OR  https://www.cdc.gov/steadi/patient.html

## 2023-03-13 LAB — SURGICAL PATHOLOGY STUDY: SIGNIFICANT CHANGE UP

## 2023-10-17 ENCOUNTER — OFFICE (OUTPATIENT)
Dept: URBAN - METROPOLITAN AREA CLINIC 12 | Facility: CLINIC | Age: 58
Setting detail: OPHTHALMOLOGY
End: 2023-10-17
Payer: COMMERCIAL

## 2023-10-17 DIAGNOSIS — D31.31: ICD-10-CM

## 2023-10-17 DIAGNOSIS — H01.001: ICD-10-CM

## 2023-10-17 DIAGNOSIS — H43.393: ICD-10-CM

## 2023-10-17 DIAGNOSIS — H52.4: ICD-10-CM

## 2023-10-17 DIAGNOSIS — H25.13: ICD-10-CM

## 2023-10-17 DIAGNOSIS — H01.004: ICD-10-CM

## 2023-10-17 PROBLEM — H52.7 REFRACTIVE ERROR: Status: ACTIVE | Noted: 2023-10-17

## 2023-10-17 PROCEDURE — 92015 DETERMINE REFRACTIVE STATE: CPT | Performed by: OPTOMETRIST

## 2023-10-17 PROCEDURE — 92250 FUNDUS PHOTOGRAPHY W/I&R: CPT | Performed by: OPTOMETRIST

## 2023-10-17 PROCEDURE — 92014 COMPRE OPH EXAM EST PT 1/>: CPT | Performed by: OPTOMETRIST

## 2023-10-17 ASSESSMENT — REFRACTION_CURRENTRX
OD_ADD: +2.75
OD_OVR_VA: 20/
OS_OVR_VA: 20/
OS_ADD: +2.75
OD_VPRISM_DIRECTION: SV
OS_VPRISM_DIRECTION: SV

## 2023-10-17 ASSESSMENT — REFRACTION_AUTOREFRACTION
OD_CYLINDER: -1.00
OS_SPHERE: +1.25
OD_AXIS: 87
OD_SPHERE: +1.25
OS_AXIS: 94
OS_CYLINDER: -1.25

## 2023-10-17 ASSESSMENT — TONOMETRY
OD_IOP_MMHG: 16
OS_IOP_MMHG: 16

## 2023-10-17 ASSESSMENT — CONFRONTATIONAL VISUAL FIELD TEST (CVF)
OD_FINDINGS: FULL
OS_FINDINGS: FULL

## 2023-10-17 ASSESSMENT — VISUAL ACUITY
OS_BCVA: 20/25
OD_BCVA: 20/30

## 2023-10-17 ASSESSMENT — KERATOMETRY
OS_K1POWER_DIOPTERS: 42.50
OS_K2POWER_DIOPTERS: 42.75
OD_K2POWER_DIOPTERS: 42.50
OS_AXISANGLE_DEGREES: 154
METHOD_AUTO_MANUAL: AUTO
OD_AXISANGLE_DEGREES: 68
OD_K1POWER_DIOPTERS: 42.25

## 2023-10-17 ASSESSMENT — SPHEQUIV_DERIVED
OD_SPHEQUIV: 0.5
OS_SPHEQUIV: 0.625
OS_SPHEQUIV: 0.5
OD_SPHEQUIV: 0.75

## 2023-10-17 ASSESSMENT — AXIALLENGTH_DERIVED
OD_AL: 23.7135
OS_AL: 23.7192
OD_AL: 23.8124
OS_AL: 23.6701

## 2023-10-17 ASSESSMENT — LID EXAM ASSESSMENTS
OD_BLEPHARITIS: T
OS_BLEPHARITIS: T

## 2023-10-17 ASSESSMENT — REFRACTION_MANIFEST
OS_VA1: 20/20-
OS_SPHERE: +0.75
OD_SPHERE: +0.75
OS_ADD: +2.50
OS_CYLINDER: -0.50
OD_AXIS: 080
OD_ADD: +2.50
OD_CYLINDER: -0.50
OD_VA1: 20/20-
OS_AXIS: 080
